# Patient Record
Sex: FEMALE | Race: BLACK OR AFRICAN AMERICAN | NOT HISPANIC OR LATINO | Employment: UNEMPLOYED | URBAN - METROPOLITAN AREA
[De-identification: names, ages, dates, MRNs, and addresses within clinical notes are randomized per-mention and may not be internally consistent; named-entity substitution may affect disease eponyms.]

---

## 2020-10-27 ENCOUNTER — HOSPITAL ENCOUNTER (EMERGENCY)
Facility: HOSPITAL | Age: 25
Discharge: HOME | End: 2020-10-27
Attending: EMERGENCY MEDICINE
Payer: MEDICAID

## 2020-10-27 VITALS
HEART RATE: 104 BPM | DIASTOLIC BLOOD PRESSURE: 63 MMHG | BODY MASS INDEX: 23.04 KG/M2 | OXYGEN SATURATION: 99 % | TEMPERATURE: 100 F | WEIGHT: 130 LBS | HEIGHT: 63 IN | RESPIRATION RATE: 20 BRPM | SYSTOLIC BLOOD PRESSURE: 114 MMHG

## 2020-10-27 DIAGNOSIS — J02.0 STREP PHARYNGITIS: Primary | ICD-10-CM

## 2020-10-27 LAB
B-HCG UR QL: NEGATIVE
CTP QC/QA: YES

## 2020-10-27 PROCEDURE — 63600175 PHARM REV CODE 636 W HCPCS: Performed by: NURSE PRACTITIONER

## 2020-10-27 PROCEDURE — 25000003 PHARM REV CODE 250: Performed by: NURSE PRACTITIONER

## 2020-10-27 PROCEDURE — 99283 EMERGENCY DEPT VISIT LOW MDM: CPT

## 2020-10-27 PROCEDURE — 81025 URINE PREGNANCY TEST: CPT | Performed by: NURSE PRACTITIONER

## 2020-10-27 RX ORDER — PENICILLIN V POTASSIUM 500 MG/1
500 TABLET, FILM COATED ORAL 2 TIMES DAILY
Qty: 20 TABLET | Refills: 0 | Status: SHIPPED | OUTPATIENT
Start: 2020-10-27 | End: 2020-11-06

## 2020-10-27 RX ORDER — IBUPROFEN 600 MG/1
600 TABLET ORAL EVERY 6 HOURS PRN
Qty: 20 TABLET | Refills: 0 | Status: SHIPPED | OUTPATIENT
Start: 2020-10-27 | End: 2020-11-01

## 2020-10-27 RX ORDER — PENICILLIN V POTASSIUM 500 MG/1
500 TABLET, FILM COATED ORAL
Status: COMPLETED | OUTPATIENT
Start: 2020-10-27 | End: 2020-10-27

## 2020-10-27 RX ORDER — PREDNISONE 20 MG/1
60 TABLET ORAL
Status: COMPLETED | OUTPATIENT
Start: 2020-10-27 | End: 2020-10-27

## 2020-10-27 RX ORDER — PREDNISONE 50 MG/1
50 TABLET ORAL DAILY
Qty: 5 TABLET | Refills: 0 | Status: SHIPPED | OUTPATIENT
Start: 2020-10-27 | End: 2020-11-01

## 2020-10-27 RX ADMIN — PENICILLIN V POTASIUM 500 MG: 500 TABLET OROPHARYNGEAL at 02:10

## 2020-10-27 RX ADMIN — IBUPROFEN 600 MG: 200 TABLET, FILM COATED ORAL at 02:10

## 2020-10-27 RX ADMIN — PREDNISONE 60 MG: 20 TABLET ORAL at 02:10

## 2020-10-27 NOTE — ED PROVIDER NOTES
Encounter Date: 10/27/2020       History     Chief Complaint   Patient presents with    Sore Throat     Patient is a 25-year-old female with no significant medical history presenting to the ED for sore throat and fever for the past 3 days.    The history is provided by the patient.     Review of patient's allergies indicates:  No Known Allergies  No past medical history on file.  No past surgical history on file.  No family history on file.  Social History     Tobacco Use    Smoking status: Not on file   Substance Use Topics    Alcohol use: Not on file    Drug use: Not on file     Review of Systems   Constitutional: Positive for activity change, appetite change ( due to pain) and fever. Negative for fatigue.   HENT: Positive for sore throat. Negative for congestion, sinus pressure, sinus pain, trouble swallowing and voice change.    Respiratory: Negative for cough, chest tightness, shortness of breath and wheezing.    Cardiovascular: Negative for chest pain and palpitations.   Gastrointestinal: Negative for abdominal pain, constipation, diarrhea, nausea and vomiting.   Genitourinary: Negative for dysuria.   Musculoskeletal: Negative for back pain.   Skin: Negative for rash.   Allergic/Immunologic: Negative for immunocompromised state.   Neurological: Negative for dizziness, syncope, weakness, numbness and headaches.   Hematological: Does not bruise/bleed easily.   All other systems reviewed and are negative.      Physical Exam     Initial Vitals [10/27/20 1407]   BP Pulse Resp Temp SpO2   114/63 104 20 99.9 °F (37.7 °C) 99 %      MAP       --         Physical Exam    Nursing note and vitals reviewed.  Constitutional: Vital signs are normal. She appears well-developed and well-nourished. She is cooperative. No distress.   HENT:   Head: Normocephalic and atraumatic.   Nose: Nose normal.   Mouth/Throat: Uvula is midline and mucous membranes are normal. Oropharyngeal exudate, posterior oropharyngeal edema and  posterior oropharyngeal erythema present. No tonsillar abscesses.   Eyes: Conjunctivae, EOM and lids are normal. Pupils are equal, round, and reactive to light.   Neck: Trachea normal, normal range of motion, full passive range of motion without pain and phonation normal. Neck supple. No JVD present.   Cardiovascular: Normal rate, regular rhythm and intact distal pulses.   Pulses:       Radial pulses are 2+ on the right side and 2+ on the left side.        Dorsalis pedis pulses are 2+ on the right side and 2+ on the left side.   Pulmonary/Chest: Effort normal and breath sounds normal.   Abdominal: Normal appearance.   Neurological: She is alert and oriented to person, place, and time. She has normal strength. No sensory deficit. GCS eye subscore is 4. GCS verbal subscore is 5. GCS motor subscore is 6.   Skin: Skin is warm, dry and intact. Capillary refill takes 2 to 3 seconds. No pallor.         ED Course   Procedures  Labs Reviewed   POCT URINE PREGNANCY          Imaging Results    None          Medical Decision Making:   Initial Assessment:   Emergent evaluation of a 25-year-old female presenting to the ED for sore throat and fever for the past 3 days.  Patient states she has a history of strep pharyngitis and feels this is similar.  Patient denies taking anything for her symptoms.  On exam patient is A&O x3.  Vital signs stable.  Slightly febrile but nontoxic appearing.  Breath sounds clear bilaterally.  Abdomen soft and nontender.  Tonsils with erythema, exudates and edema.  No hot potato voice or PTA noted on exam.  Patient able handle own secretions and speaking in full sentences.  Differential Diagnosis:   Differential diagnoses include but are not limited to strep pharyngitis, viral pharyngitis, mononucleosis, influenza, peritonsillar abscess, epiglottitis,christina's angina or others.      Clinical Tests:   Lab Tests: Ordered and Reviewed  The following lab test(s) were unremarkable: UPT       <> Summary of  Lab: UPT negative  ED Management:  I do not feel labs or imaging are pertinent for the care this patient.  I discussed this case with my supervising physician.    Patient treated with penicillin, prednisone and morphine.  Increase rest and fluids.  Follow-up with PCP as needed.    Patient is hemodynamically stable, vital signs are normal. Discharge instructions given. Return to ED precautions discussed. Follow up as directed. Pt verbalized understanding of this plan.  Pt is stable for discharge.                   Attending Attestation:     Physician Attestation Statement for NP/PA:   I discussed this assessment and plan of this patient with the NP/PA, but I did not personally examine the patient. The face to face encounter was performed by the NP/PA.                            Clinical Impression:       ICD-10-CM ICD-9-CM   1. Strep pharyngitis  J02.0 034.0                          ED Disposition Condition    Discharge Stable        ED Prescriptions     Medication Sig Dispense Start Date End Date Auth. Provider    ibuprofen (ADVIL,MOTRIN) 600 MG tablet Take 1 tablet (600 mg total) by mouth every 6 (six) hours as needed for Pain. 20 tablet 10/27/2020 11/1/2020 Vandana Galvan NP    penicillin v potassium (VEETID) 500 MG tablet Take 1 tablet (500 mg total) by mouth 2 (two) times a day. for 10 days 20 tablet 10/27/2020 11/6/2020 Vandana Galvan NP    predniSONE (DELTASONE) 50 MG Tab Take 1 tablet (50 mg total) by mouth once daily. for 5 days 5 tablet 10/27/2020 11/1/2020 Vandana Galvan NP        Follow-up Information     Follow up With Specialties Details Why Contact Info    Minh Perez MD Internal Medicine Schedule an appointment as soon as possible for a visit  As needed [address]  [address]  [facility]  [facility] [address]  [phone]                                         Vandana Galvan NP  10/27/20 1639       Jamin Rose MD  10/27/20 2046

## 2020-10-27 NOTE — DISCHARGE INSTRUCTIONS
We have prescribed you penicillin, ibuprofen and prednisone for your strep pharyngitis.  Salt water gargles may help with pain.  Rotate Tylenol ibuprofen as needed for pain.    Our goal in the emergency department is to always give you outstanding care and exceptional service. You may receive a survey by mail or e-mail in the next week regarding your experience in our ED. We would greatly appreciate your completing and returning the survey. Your feedback provides us with a way to recognize our staff who give very good care and it helps us learn how to improve when your experience was below our aspiration of excellence.

## 2020-10-27 NOTE — FIRST PROVIDER EVALUATION
"Medical screening exam completed.  I have conducted a focused provider triage encounter, findings are as follows:    Brief history of present illness:  Sore throat and fever x 3 days.  Pt has history of sore throat and pain is similar.  Pt also has left lower lip swelling since that time.  No difficulty breathing or handling secretions. Pt also states that she is 12 days late on her period.       Vitals:    10/27/20 1407   BP: 114/63   BP Location: Left arm   Patient Position: Sitting   Pulse: 104   Resp: 20   Temp: 99.9 °F (37.7 °C)   TempSrc: Oral   SpO2: 99%   Weight: 59 kg (130 lb)   Height: 5' 3" (1.6 m)       Pertinent physical exam:  Lower lip swelling, tonsils with erythema and exudates.     Brief workup plan:  abx    Preliminary workup initiated; this workup will be continued and followed by the physician or advanced practice provider that is assigned to the patient when roomed.  "

## 2020-10-27 NOTE — Clinical Note
"Chaitanya "Rui Kessler was seen and treated in our emergency department on 10/27/2020.  She may return to work on 10/29/2020.       If you have any questions or concerns, please don't hesitate to call.      Vandana Galvan NP"